# Patient Record
Sex: MALE | Race: WHITE | Employment: FULL TIME | URBAN - METROPOLITAN AREA
[De-identification: names, ages, dates, MRNs, and addresses within clinical notes are randomized per-mention and may not be internally consistent; named-entity substitution may affect disease eponyms.]

---

## 2018-10-12 ENCOUNTER — HOSPITAL ENCOUNTER (INPATIENT)
Facility: CLINIC | Age: 38
LOS: 2 days | Discharge: HOME | DRG: 603 | End: 2018-10-14
Attending: EMERGENCY MEDICINE | Admitting: INTERNAL MEDICINE
Payer: COMMERCIAL

## 2018-10-12 ENCOUNTER — APPOINTMENT (OUTPATIENT)
Dept: GENERAL RADIOLOGY | Facility: CLINIC | Age: 38
DRG: 603 | End: 2018-10-12
Attending: EMERGENCY MEDICINE
Payer: COMMERCIAL

## 2018-10-12 DIAGNOSIS — L03.114 CELLULITIS OF LEFT UPPER EXTREMITY: ICD-10-CM

## 2018-10-12 DIAGNOSIS — I89.1 LYMPHANGITIS: ICD-10-CM

## 2018-10-12 DIAGNOSIS — L03.115 CELLULITIS OF RIGHT LOWER EXTREMITY: ICD-10-CM
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
## 2018-10-12 PROBLEM — L03.90 CELLULITIS: Status: ACTIVE | Noted: 2018-10-12

## 2018-10-12 LAB
ANION GAP SERPL CALCULATED.3IONS-SCNC: 7 MMOL/L (ref 3–14)
BASOPHILS # BLD AUTO: 0 10E9/L (ref 0–0.2)
BASOPHILS NFR BLD AUTO: 0.2 %
BUN SERPL-MCNC: 11 MG/DL (ref 7–30)
CALCIUM SERPL-MCNC: 9.6 MG/DL (ref 8.5–10.1)
CHLORIDE SERPL-SCNC: 101 MMOL/L (ref 94–109)
CO2 SERPL-SCNC: 29 MMOL/L (ref 20–32)
CREAT SERPL-MCNC: 0.82 MG/DL (ref 0.66–1.25)
CRP SERPL-MCNC: 76.8 MG/L (ref 0–8)
DIFFERENTIAL METHOD BLD: ABNORMAL
EOSINOPHIL # BLD AUTO: 0.1 10E9/L (ref 0–0.7)
EOSINOPHIL NFR BLD AUTO: 0.7 %
ERYTHROCYTE [DISTWIDTH] IN BLOOD BY AUTOMATED COUNT: 12.8 % (ref 10–15)
GFR SERPL CREATININE-BSD FRML MDRD: >90 ML/MIN/1.7M2
GLUCOSE SERPL-MCNC: 100 MG/DL (ref 70–99)
HCT VFR BLD AUTO: 47.1 % (ref 40–53)
HGB BLD-MCNC: 15.8 G/DL (ref 13.3–17.7)
IMM GRANULOCYTES # BLD: 0 10E9/L (ref 0–0.4)
IMM GRANULOCYTES NFR BLD: 0.3 %
LYMPHOCYTES # BLD AUTO: 1.3 10E9/L (ref 0.8–5.3)
LYMPHOCYTES NFR BLD AUTO: 12.1 %
MCH RBC QN AUTO: 32.4 PG (ref 26.5–33)
MCHC RBC AUTO-ENTMCNC: 33.5 G/DL (ref 31.5–36.5)
MCV RBC AUTO: 97 FL (ref 78–100)
MONOCYTES # BLD AUTO: 0.9 10E9/L (ref 0–1.3)
MONOCYTES NFR BLD AUTO: 8.2 %
NEUTROPHILS # BLD AUTO: 8.5 10E9/L (ref 1.6–8.3)
NEUTROPHILS NFR BLD AUTO: 78.5 %
NRBC # BLD AUTO: 0 10*3/UL
NRBC BLD AUTO-RTO: 0 /100
PLATELET # BLD AUTO: 226 10E9/L (ref 150–450)
POTASSIUM SERPL-SCNC: 4.3 MMOL/L (ref 3.4–5.3)
RBC # BLD AUTO: 4.88 10E12/L (ref 4.4–5.9)
SODIUM SERPL-SCNC: 137 MMOL/L (ref 133–144)
WBC # BLD AUTO: 10.8 10E9/L (ref 4–11)

## 2018-10-12 PROCEDURE — 90715 TDAP VACCINE 7 YRS/> IM: CPT | Performed by: EMERGENCY MEDICINE

## 2018-10-12 PROCEDURE — 90471 IMMUNIZATION ADMIN: CPT | Performed by: EMERGENCY MEDICINE

## 2018-10-12 PROCEDURE — 73630 X-RAY EXAM OF FOOT: CPT | Mod: RT

## 2018-10-12 PROCEDURE — 25000128 H RX IP 250 OP 636

## 2018-10-12 PROCEDURE — 86140 C-REACTIVE PROTEIN: CPT | Performed by: EMERGENCY MEDICINE

## 2018-10-12 PROCEDURE — 25000132 ZZH RX MED GY IP 250 OP 250 PS 637: Performed by: PHYSICIAN ASSISTANT

## 2018-10-12 PROCEDURE — 80048 BASIC METABOLIC PNL TOTAL CA: CPT | Performed by: EMERGENCY MEDICINE

## 2018-10-12 PROCEDURE — 99207 ZZC APP CREDIT; MD BILLING SHARED VISIT: CPT | Performed by: PHYSICIAN ASSISTANT

## 2018-10-12 PROCEDURE — 85025 COMPLETE CBC W/AUTO DIFF WBC: CPT | Performed by: EMERGENCY MEDICINE

## 2018-10-12 PROCEDURE — 99285 EMERGENCY DEPT VISIT HI MDM: CPT | Mod: 25 | Performed by: EMERGENCY MEDICINE

## 2018-10-12 PROCEDURE — 12000001 ZZH R&B MED SURG/OB UMMC

## 2018-10-12 PROCEDURE — 96365 THER/PROPH/DIAG IV INF INIT: CPT | Performed by: EMERGENCY MEDICINE

## 2018-10-12 PROCEDURE — 25000128 H RX IP 250 OP 636: Performed by: PHYSICIAN ASSISTANT

## 2018-10-12 PROCEDURE — 87040 BLOOD CULTURE FOR BACTERIA: CPT | Performed by: EMERGENCY MEDICINE

## 2018-10-12 PROCEDURE — 99284 EMERGENCY DEPT VISIT MOD MDM: CPT | Mod: Z6 | Performed by: EMERGENCY MEDICINE

## 2018-10-12 PROCEDURE — 73130 X-RAY EXAM OF HAND: CPT | Mod: LT

## 2018-10-12 PROCEDURE — 25000128 H RX IP 250 OP 636: Performed by: EMERGENCY MEDICINE

## 2018-10-12 PROCEDURE — 99222 1ST HOSP IP/OBS MODERATE 55: CPT | Mod: AI | Performed by: INTERNAL MEDICINE

## 2018-10-12 RX ORDER — SODIUM CHLORIDE 9 MG/ML
INJECTION, SOLUTION INTRAVENOUS
Status: COMPLETED
Start: 2018-10-12 | End: 2018-10-12

## 2018-10-12 RX ORDER — ONDANSETRON 4 MG/1
4 TABLET, ORALLY DISINTEGRATING ORAL EVERY 6 HOURS PRN
Status: DISCONTINUED | OUTPATIENT
Start: 2018-10-12 | End: 2018-10-14 | Stop reason: HOSPADM

## 2018-10-12 RX ORDER — NALOXONE HYDROCHLORIDE 0.4 MG/ML
.1-.4 INJECTION, SOLUTION INTRAMUSCULAR; INTRAVENOUS; SUBCUTANEOUS
Status: DISCONTINUED | OUTPATIENT
Start: 2018-10-12 | End: 2018-10-14 | Stop reason: HOSPADM

## 2018-10-12 RX ORDER — PIPERACILLIN SODIUM, TAZOBACTAM SODIUM 3; .375 G/15ML; G/15ML
3.38 INJECTION, POWDER, LYOPHILIZED, FOR SOLUTION INTRAVENOUS EVERY 6 HOURS
Status: DISCONTINUED | OUTPATIENT
Start: 2018-10-12 | End: 2018-10-14 | Stop reason: HOSPADM

## 2018-10-12 RX ORDER — PIPERACILLIN SODIUM, TAZOBACTAM SODIUM 3; .375 G/15ML; G/15ML
3.38 INJECTION, POWDER, LYOPHILIZED, FOR SOLUTION INTRAVENOUS ONCE
Status: COMPLETED | OUTPATIENT
Start: 2018-10-12 | End: 2018-10-12

## 2018-10-12 RX ORDER — ACETAMINOPHEN 325 MG/1
650 TABLET ORAL EVERY 4 HOURS PRN
Status: DISCONTINUED | OUTPATIENT
Start: 2018-10-12 | End: 2018-10-14 | Stop reason: HOSPADM

## 2018-10-12 RX ORDER — ONDANSETRON 2 MG/ML
4 INJECTION INTRAMUSCULAR; INTRAVENOUS EVERY 6 HOURS PRN
Status: DISCONTINUED | OUTPATIENT
Start: 2018-10-12 | End: 2018-10-14 | Stop reason: HOSPADM

## 2018-10-12 RX ADMIN — ACETAMINOPHEN 650 MG: 325 TABLET, FILM COATED ORAL at 22:12

## 2018-10-12 RX ADMIN — PIPERACILLIN SODIUM AND TAZOBACTAM SODIUM 3.38 G: 3; .375 INJECTION, POWDER, LYOPHILIZED, FOR SOLUTION INTRAVENOUS at 09:51

## 2018-10-12 RX ADMIN — SODIUM CHLORIDE, PRESERVATIVE FREE: 5 INJECTION INTRAVENOUS at 17:06

## 2018-10-12 RX ADMIN — CLOSTRIDIUM TETANI TOXOID ANTIGEN (FORMALDEHYDE INACTIVATED), CORYNEBACTERIUM DIPHTHERIAE TOXOID ANTIGEN (FORMALDEHYDE INACTIVATED), BORDETELLA PERTUSSIS TOXOID ANTIGEN (GLUTARALDEHYDE INACTIVATED), BORDETELLA PERTUSSIS FILAMENTOUS HEMAGGLUTININ ANTIGEN (FORMALDEHYDE INACTIVATED), BORDETELLA PERTUSSIS PERTACTIN ANTIGEN, AND BORDETELLA PERTUSSIS FIMBRIAE 2/3 ANTIGEN 0.5 ML: 5; 2; 2.5; 5; 3; 5 INJECTION, SUSPENSION INTRAMUSCULAR at 10:27

## 2018-10-12 RX ADMIN — PIPERACILLIN SODIUM,TAZOBACTAM SODIUM 3.38 G: 3; .375 INJECTION, POWDER, FOR SOLUTION INTRAVENOUS at 17:03

## 2018-10-12 RX ADMIN — PIPERACILLIN SODIUM,TAZOBACTAM SODIUM 3.38 G: 3; .375 INJECTION, POWDER, FOR SOLUTION INTRAVENOUS at 22:07

## 2018-10-12 ASSESSMENT — ACTIVITIES OF DAILY LIVING (ADL)
ADLS_ACUITY_SCORE: 11
ADLS_ACUITY_SCORE: 13

## 2018-10-12 ASSESSMENT — ENCOUNTER SYMPTOMS
ARTHRALGIAS: 0
JOINT SWELLING: 0
SHORTNESS OF BREATH: 0
FEVER: 0
CHILLS: 0
WOUND: 1
ABDOMINAL PAIN: 0
NAUSEA: 0
NUMBNESS: 0
VOMITING: 0
COUGH: 0
WEAKNESS: 0

## 2018-10-12 NOTE — PLAN OF CARE
Problem: Patient Care Overview  Goal: Individualization & Mutuality  Outcome: Improving  Patient arrived on floor with dog bite, alert and oriented times four, left hand and right foot dressing clean,dry and intact, redness remains on hand, able to slightly wiggle fingers, drinking and voiding well. Will continue to monitor patient.

## 2018-10-12 NOTE — H&P
[facility]    Internal Medicine History and Physical - [place]       Date of Admission:  10/12/2018    Assessment & Plan   James Orr is a 38 year old male with no significant past medical history who was presented to [facility] s/p dog bit with erythema, edema and lymphangitis of R foot and L hand.     # Cellulitis of L hand and R foot 2/2 dog bite  # Lymphangitis  Multiple puncture wounds on L palm and R foot after breaking up a dog fight 10/11. On 10/12 noted extension or erythema from L hand up left forearm to L antecubital. Denies fevers or chills. No leukocytosis on admission. XR R foot and L hand without bony abnormality or evidence of FB. BC x 2 obtained and started on IV Zosyn in ED.   - Continue Zosyn  - Follow BCx  - Add on CRP  - Trend CBC  - Ortho consult      Diet:  Regular diet  Fluids: PO intake  Freeman Catheter: not present    DVT Prophylaxis: Mechanical, ambulation  Code Status: No Order    Expected discharge: 2 - 3 days, recommended to prior living arrangement once antibiotic plan established.    The patient's care was discussed with the Attending Physician, Dr. Pryor.    Eda Sanchez PA-C  Internal Medicine Staff Hospitalist Service  [facility]  Pager: [phone]  Please see sticky note for cross cover information  ______________________________________________________________________    Chief Complaint   Hand/foot swelling     History is obtained from the patient    History of Present Illness   James Orr is a 38 year old male with no significant past medical history who was presented to [facility] s/p dog bit with erythema, edema and lymphangitis of R foot and L hand.   Pt sustained multiple dog bites to L hand and R foot yesterday afternoon. He reports that he attempted to break up a fight between his dog and another dog that was staying with him. Following the attack he noted that he was bleeding a small amount from his L  hand. 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 When he returned home he noted that his hand was red and swollen, but not significantly increased from the morning. When he awoke this morning (10/12) he found that his hand was significantly more swollen, and the redness that had previously been surrounding just the puncture sites had extended up his arm. He presented to urgent care where he was instructed to go to the emergency department.   In the ED he denies any fevers or chills. He notes that both dogs are up to date on their vaccines including rabies. He is quite certain that he is up to date on his vaccinations, including tetanus as he was seen in primary care clinic within the last 3 years.  In the ED he had a XR of L hand and R foot which were negative for any bony abnormalities or FB. He also received tetanus booster and given a dose of Zosyn.    Review of Systems   The 10 point Review of Systems is negative other than noted in the HPI or here.     Past Medical History    Past medical history reviewed with no previously diagnosed medical problems.    Past Surgical History   Past surgical history review with no previous surgeries identified.    Social History   Social History   Substance Use Topics     Smoking status: Never Smoker     Smokeless tobacco: Current User      Comment: Vaps     Alcohol use Yes      Comment: 1-2/night       Family History   I have reviewed this patient's family history and updated it with pertinent information if needed.   No family history on file.    Prior to Admission Medications   None     Allergies   Allergies   Allergen Reactions     Metal [Staples]      Sensitive to metal on clothing. Like belts, etc.       Physical Exam   Vital Signs: Temp: 98.8  F (37.1  C) Temp src: Oral BP: 148/89 Pulse: 86   Resp: 16 SpO2: 100 % O2 Device: None (Room air)    Weight: 190 lbs 6 oz    General Appearance: Alert and  oriented x 3, NAD  Eyes: PERRL  HEENT: Head normocephalic, atraumatic  Respiratory: Lungs CTAB, no wheezing or crackles  Cardiovascular: RRR, no murmurs or gallops  GI: Abdomen soft, non distended, non tender to palpation  Lymph/Hematologic: Lymphangitis extending from L wrist up L anterior forearm to antecubital  Skin: warm and dry to touch, erythema, edema of dorsal and vargas aspect of L hand. 1inch puncture wound along dorsal aspect of palp just proximal to MCP joint of 5th metatarsal with some drainage. R foot with multiple puncture wounds on plantar and dorsal aspect, erythema and edema present not involving the mortise joint. No fluctuance present  Musculoskeletal: Tenderness to palpation along skin of L palm, able to extend all PIP, MCP and DIP joints w/ some stiffness 2/2 swelling  Neurologic: no focal deficits  Psychiatric: mood stable    Data   Data reviewed today: I reviewed all medications, new labs and imaging results over the last 24 hours.      Recent Labs  Lab 10/12/18  0918   WBC 10.8   HGB 15.8   MCV 97         POTASSIUM 4.3   CHLORIDE 101   CO2 29   BUN 11   CR 0.82   ANIONGAP 7   AB 9.6   *

## 2018-10-12 NOTE — PROVIDER NOTIFICATION
Eda called back and was updated about patient elevated BP and she said it was ok , no new order received.

## 2018-10-12 NOTE — ED NOTES
[facility]   ED Nurse to Floor Handoff     James Orr is a 38 year old male who speaks English and lives alone,  in a home  They arrived in the ED by car from home    ED Chief Complaint: Dog Bite (Bite by 2 dogs yesterday at about 0500 in his house.  Pt states both dogs shot are UTD and he was dog sitting them and they got in a fight and he broke it up.  Bit in left hand and right foot.)    ED Dx;   Final diagnoses:   Dog bite, initial encounter   Cellulitis of left upper extremity   Cellulitis of right lower extremity   Lymphangitis         Needed?: No    Allergies:   Allergies   Allergen Reactions     Metal [Staples]      Sensitive to metal on clothing. Like belts, etc.   .  Past Medical Hx: History reviewed. No pertinent past medical history.   Baseline Mental status: WDL  Current Mental Status changes: at basesline    Infection present or suspected this encounter: yes skin/wound/contact  Sepsis suspected: No  Isolation type: No active isolations     Activity level - Baseline/Home:  Independent  Activity Level - Current:   Independent    Bariatric equipment needed?: No    In the ED these meds were given:   Medications   piperacillin-tazobactam (ZOSYN) 3.375 g vial to attach to  mL bag (0 g Intravenous Stopped 10/12/18 1026)   Tdap (tetanus-diphtheria-acell pertussis) (ADACEL) injection 0.5 mL (0.5 mLs Intramuscular Given 10/12/18 1027)       Drips running?  No    Home pump  No    Current LDAs  Peripheral IV 10/12/18 Right (Active)   Site Assessment WDL 10/12/2018  9:17 AM   Line Status Saline locked 10/12/2018  9:17 AM   Phlebitis Scale 0-->no symptoms 10/12/2018  9:17 AM   Infiltration Scale 0 10/12/2018  9:17 AM   Extravasation? No 10/12/2018  9:17 AM   Number of days:0       Labs results:   Labs Ordered and Resulted from Time of ED Arrival Up to the Time of Departure from the ED   CBC WITH PLATELETS DIFFERENTIAL - Abnormal; Notable for the  following:        Result Value    Absolute Neutrophil 8.5 (*)     All other components within normal limits   BASIC METABOLIC PANEL - Abnormal; Notable for the following:     Glucose 100 (*)     All other components within normal limits   BLOOD CULTURE   BLOOD CULTURE       Imaging Studies:   Recent Results (from the past 24 hour(s))   XR Hand Left G/E 3 Views    Narrative    XR HAND LT G/E 3 VW   10/12/2018 9:40 AM     HISTORY:  left 5th MCP puncture wound, rule out FB or fracture;       Impression    IMPRESSION: Unremarkable exam.    DAYLIN QUIROZ MD   Foot  XR, G/E 3 views, right    Narrative    XR FOOT RT G/E 3 VW   10/12/2018 9:41 AM     HISTORY:  right great toe injury from dog bites, rule out FB/fracture;        Impression    IMPRESSION: Unremarkable exam.    DAYLIN QUIROZ MD       Recent vital signs:   BP (!) 146/101  Pulse 95  Temp 98.9  F (37.2  C) (Oral)  Resp 16  Wt 86.4 kg (190 lb 6 oz)  SpO2 99%    Cardiac Rhythm: Other, not assessed  Pt needs tele? No  Skin/wound Issues: Infection from dog bite    Code Status: Full Code    Pain control: good    Nausea control: pt had none    Abnormal labs/tests/findings requiring intervention: normal    Family present during ED course? No   Family Comments/Social Situation comments:  without children.  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

## 2018-10-12 NOTE — PROVIDER NOTIFICATION
Patient Blood pressure elevated. Paged internal medicine PA ( Eda Sanchez) and awaiting call back. Will continue to monitor patient.

## 2018-10-12 NOTE — ED PROVIDER NOTES
History     Chief Complaint   Patient presents with     Dog Bite     Bite by 2 dogs yesterday at about 0500 in his house.  Pt states both dogs shot are UTD and he was dog sitting them and they got in a fight and he broke it up.  Bit in left hand and right foot.     HPI  James Orr is a 38 year old otherwise healthy right-hand-dominant male who sustained dog bites to the left hand and right foot at 5 am yesterday morning.  He reports he broke up a fight between his dog and a friend's dog who is staying with him.  Both dogs are UTD on immunizations including rabies.  Since then he has had increased redness and swelling at both sites.  He has noted redness extending up his left arm.  He is able to flex and extend at the joints near the puncture wounds though has had increasing pain and swelling.  He reports he did receive immunization updates approximately 3 years ago though is unsure which immunizations.  We do not have any record of his most recent updates.  He denies any recent fevers, chills or other acute concerns.    I have reviewed the Medications, Allergies, Past Medical and Surgical History, and Social History in the Epic system.    Review of Systems   Constitutional: Negative for chills and fever.   Respiratory: Negative for cough and shortness of breath.    Cardiovascular: Negative for chest pain.   Gastrointestinal: Negative for abdominal pain, nausea and vomiting.   Musculoskeletal: Negative for arthralgias and joint swelling.   Skin: Positive for wound.   Neurological: Negative for weakness and numbness.   All other systems reviewed and are negative.      Physical Exam   BP: (!) 146/101  Pulse: 95  Temp: 98.9  F (37.2  C)  Resp: 16  Weight: 86.4 kg (190 lb 6 oz)  SpO2: 99 %      Physical Exam   General: patient is alert and oriented and in no acute distress   Head: atraumatic and normocephalic   EENT: moist mucus membranes, sclera anicteric  Neck: supple   Cardiovascular: regular rate and  rhythm, extremities warm and well perfused, no lower extremity edema, 2+ radial and DP pulses, brisk capillary refill in digits of left hand and right foot  Pulmonary: No respiratory distress  Musculoskeletal: TTP along the palm of the left hand, able to fully extend the digits of the left hand including of the left fifth MCP, PIP and DIP joints, no tenderness to palpation along the flexor tendon sheath of the left fifth digit, able to flex to approximately 45 degrees of the left fifth MCP and PIP joint, tenderness to palpation of the right great toe with full extension but limited flexion secondary to pain and swelling  Neurological: alert and oriented, moving all extremities symmetrically, sensation to light touch along the digits of the left hand and right foot is intact   skin: warm, dry, skin wounds as in imaging below                            ED Course     ED Course     Procedures             Critical Care time:  none             Labs Ordered and Resulted from Time of ED Arrival Up to the Time of Departure from the ED   CBC WITH PLATELETS DIFFERENTIAL - Abnormal; Notable for the following:        Result Value    Absolute Neutrophil 8.5 (*)     All other components within normal limits   BASIC METABOLIC PANEL   BLOOD CULTURE   BLOOD CULTURE            Assessments & Plan (with Medical Decision Making)   38-year-old otherwise healthy right-hand-dominant male who presents with dog bites to the left hand and right foot that he sustained yesterday morning.  Patient was attempting to break up a fight between his dog and a friend's dog who he was watching when he was inadvertently bitten.  Both dogs are up-to-date on their immunizations.  The patient reports that he believes he is up-to-date as well however we do not have record of his last tetanus immunization.  Tetanus updated in the ED.  He is hemodynamically stable and afebrile.  The patient does have significant erythema and swelling along the left fifth MCP  joint as well as the right great toe.  He has lymphangitis extending up to the axilla on the left arm.  Given the rapidity of the progression with associated lymphangitis we will plan to treat with IV antibiotics.  At this time he does not appear to have involvement of the joint though will require close observation.  Patient did have x-rays obtained of the left hand and right foot to rule out foreign body or fracture.  X-rays are negative.  He was treated with Zosyn.  Patient will be admitted to Medicine for continued antibiotics with transition to oral.    I have reviewed the nursing notes.    I have reviewed the findings, diagnosis, plan and need for follow up with the patient.  This part of the document was transcribed by Laurie Beard Scribe.   New Prescriptions    No medications on file       Final diagnoses:   Dog bite, initial encounter   Cellulitis of left upper extremity   Cellulitis of right lower extremity   Lymphangitis       10/12/2018   [facility], [place], EMERGENCY DEPARTMENT     Genet Knight MD  10/12/18 2644

## 2018-10-12 NOTE — PROGRESS NOTES
Patient is now on regular diet and should NPO after Midnight. Antibiotic given. Denied pain at this time. Will continue to monitor patient.

## 2018-10-12 NOTE — IP AVS SNAPSHOT
[facility]    [address] [place] [address]    Phone:  [phone]                                       After Visit Summary   10/12/2018    James Orr    MRN: [id number]           After Visit Summary Signature Page     I have received my discharge instructions, and my questions have been answered. I have discussed any challenges I see with this plan with the nurse or doctor.    ..........................................................................................................................................  Patient/Patient Representative Signature      ..........................................................................................................................................  Patient Representative Print Name and Relationship to Patient    ..................................................               ................................................  Date                                   Time    ..........................................................................................................................................  Reviewed by Signature/Title    ...................................................              ..............................................  Date                                               Time          22EPIC Rev 08/18

## 2018-10-12 NOTE — ED NOTES
Introduced self to pt. Pt explained his current situation. This writer gave pt the update to moving him to upstairs.

## 2018-10-12 NOTE — IP AVS SNAPSHOT
MRN:[id number]                      After Visit Summary   10/12/2018    James Orr    MRN: [id number]           Thank you!     Thank you for choosing [facility] for your care. Our goal is always to provide you with excellent care. Hearing back from our patients is one way we can continue to improve our services. Please take a few minutes to complete the written survey that you may receive in the mail after you visit with us. Thank you!        Patient Information     Date Of Birth          1980        Designated Caregiver       Most Recent Value    Caregiver    Will someone help with your care after discharge? no      About your hospital stay     You were admitted on:  October 12, 2018 You last received care in the:  [facility]    You were discharged on:  October 14, 2018        Reason for your hospital stay       L hand infection secondary to a dog bite.  R foot dog bite                  Who to Call     For medical emergencies, please call 911.  For non-urgent questions about your medical care, please call your primary care provider or clinic, None          Attending Provider     Provider Specialty    Genet Knight MD Emergency Medicine Emergency Medical Services    Dinesh Pryor MD Internal Medicine       Primary Care Provider Fax #    Physician No Ref-Primary [phone]      Further instructions from your care team       Return to the Emergency Room if you experience increased pain or swelling left hand or right foot, or if you experience severe diarrhea    Pending Results     Date and Time Order Name Status Description    10/12/2018 0909 Blood culture Preliminary     10/12/2018 0909 Blood culture Preliminary             Statement of Approval     Ordered          10/14/18 0811  I have reviewed and agree with all the recommendations and orders detailed in this document.  EFFECTIVE NOW     Approved and electronically signed by:  Dinesh Pryor MD            "  Admission Information     Date & Time Provider Department Dept. Phone    10/12/2018 Dinesh Pryor MD  8A [phone]      Your Vitals Were     Blood Pressure Pulse Temperature Respirations Height Weight    119/85 (BP Location: Left arm) 66 97.4  F (36.3  C) (Oral) 16 1.702 m (5' 7\") 86.4 kg (190 lb 6 oz)    Pulse Oximetry BMI (Body Mass Index)                100% 29.82 kg/m2          [email or web address] Information     [employer] lets you send messages to your doctor, view your test results, renew your prescriptions, schedule appointments and more. To sign up, go to [email or web address] . Click on \"Log in\" on the left side of the screen, which will take you to the Welcome page. Then click on \"Sign up Now\" on the right side of the page.     You will be asked to enter the access code listed below, as well as some personal information. Please follow the directions to create your username and password.     Your access code is: [email or web address]  Expires: 2019  4:48 AM     Your access code will  in 90 days. If you need help or a new code, please call your [place] clinic or [phone].        Care EveryWhere ID     This is your Care EveryWhere ID. This could be used by other organizations to access your [place] medical records  [id number]        Equal Access to Services     [facility] : Hadii duarte de la cruzo Somegan, waaxda luqadaha, qaybta kaalmada mary bethyada, barry coleman . So [facility] [phone].    ATENCIÓN: Si habla español, tiene a dill disposición servicios gratuitos de asistencia lingüística. Llame al [phone].    We comply with applicable federal civil rights laws and [place] laws. We do not discriminate on the basis of race, color, national origin, age, disability, sex, sexual orientation, or gender identity.               Review of your medicines      START taking        Dose / Directions    amoxicillin-clavulanate 875-125 MG per tablet   Commonly known as:  AUGMENTIN "        Dose:  1 tablet   Take 1 tablet by mouth 2 times daily for 11 days   Quantity:  22 tablet   Refills:  0            Where to get your medicines      These medications were sent to [facility] [place], [place] - [address]  [address], [facility] [address]     Phone:  [phone]     amoxicillin-clavulanate 875-125 MG per tablet                Protect others around you: Learn how to safely use, store and throw away your medicines at www.disposemymeds.org.        ANTIBIOTIC INSTRUCTION     You've Been Prescribed an Antibiotic - Now What?  Your healthcare team thinks that you or your loved one might have an infection. Some infections can be treated with antibiotics, which are powerful, life-saving drugs. Like all medications, antibiotics have side effects and should only be used when necessary. There are some important things you should know about your antibiotic treatment.      Your healthcare team may run tests before you start taking an antibiotic.    Your team may take samples (e.g., from your blood, urine or other areas) to run tests to look for bacteria. These test can be important to determine if you need an antibiotic at all and, if you do, which antibiotic will work best.      Within a few days, your healthcare team might change or even stop your antibiotic.    Your team may start you on an antibiotic while they are working to find out what is making you sick.    Your team might change your antibiotic because test results show that a different antibiotic would be better to treat your infection.    In some cases, once your team has more information, they learn that you do not need an antibiotic at all. They may find out that you don't have an infection, or that the antibiotic you're taking won't work against your infection. For example, an infection caused by a virus can't be treated with antibiotics. Staying on an antibiotic when you don't need it is more likely to be  harmful than helpful.      You may experience side effects from your antibiotic.    Like all medications, antibiotics have side effects. Some of these can be serious.    Let you healthcare team know if you have any known allergies when you are admitted to the hospital.    One significant side effect of nearly all antibiotics is the risk of severe and sometimes deadly diarrhea caused by Clostridium difficile (C. Difficile). This occurs when a person takes antibiotics because some good germs are destroyed. Antibiotic use allows C. diificile to take over, putting patients at high risk for this serious infection.    As a patient or caregiver, it is important to understand your or your loved one's antibiotic treatment. It is especially important for caregivers to speak up when patients can't speak for themselves. Here are some important questions to ask your healthcare team.    What infection is this antibiotic treating and how do you know I have that infection?    What side effects might occur from this antibiotic?    How long will I need to take this antibiotic?    Is it safe to take this antibiotic with other medications or supplements (e.g., vitamins) that I am taking?     Are there any special directions I need to know about taking this antibiotic? For example, should I take it with food?    How will I be monitored to know whether my infection is responding to the antibiotic?    What tests may help to make sure the right antibiotic is prescribed for me?      Information provided by:  www.cdc.gov/getsmart  U.S. Department of Health and Human Services  Centers for disease Control and Prevention  National Center for Emerging and Zoonotic Infectious Diseases  Division of Healthcare Quality Promotion             Medication List: This is a list of all your medications and when to take them. Check marks below indicate your daily home schedule. Keep this list as a reference.      Medications           Morning Afternoon  Evening Bedtime As Needed    amoxicillin-clavulanate 875-125 MG per tablet   Commonly known as:  AUGMENTIN   Take 1 tablet by mouth 2 times daily for 11 days

## 2018-10-13 LAB
BASOPHILS # BLD AUTO: 0 10E9/L (ref 0–0.2)
BASOPHILS NFR BLD AUTO: 0.3 %
CRP SERPL-MCNC: 71.1 MG/L (ref 0–8)
DIFFERENTIAL METHOD BLD: NORMAL
EOSINOPHIL # BLD AUTO: 0.3 10E9/L (ref 0–0.7)
EOSINOPHIL NFR BLD AUTO: 4.2 %
ERYTHROCYTE [DISTWIDTH] IN BLOOD BY AUTOMATED COUNT: 12.7 % (ref 10–15)
HCT VFR BLD AUTO: 42.7 % (ref 40–53)
HGB BLD-MCNC: 14.1 G/DL (ref 13.3–17.7)
IMM GRANULOCYTES # BLD: 0 10E9/L (ref 0–0.4)
IMM GRANULOCYTES NFR BLD: 0.3 %
LYMPHOCYTES # BLD AUTO: 1.1 10E9/L (ref 0.8–5.3)
LYMPHOCYTES NFR BLD AUTO: 15.9 %
MCH RBC QN AUTO: 31.9 PG (ref 26.5–33)
MCHC RBC AUTO-ENTMCNC: 33 G/DL (ref 31.5–36.5)
MCV RBC AUTO: 97 FL (ref 78–100)
MONOCYTES # BLD AUTO: 0.6 10E9/L (ref 0–1.3)
MONOCYTES NFR BLD AUTO: 9.1 %
NEUTROPHILS # BLD AUTO: 4.7 10E9/L (ref 1.6–8.3)
NEUTROPHILS NFR BLD AUTO: 70.2 %
NRBC # BLD AUTO: 0 10*3/UL
NRBC BLD AUTO-RTO: 0 /100
PLATELET # BLD AUTO: 195 10E9/L (ref 150–450)
RBC # BLD AUTO: 4.42 10E12/L (ref 4.4–5.9)
WBC # BLD AUTO: 6.7 10E9/L (ref 4–11)

## 2018-10-13 PROCEDURE — 86140 C-REACTIVE PROTEIN: CPT | Performed by: PHYSICIAN ASSISTANT

## 2018-10-13 PROCEDURE — 12000001 ZZH R&B MED SURG/OB UMMC

## 2018-10-13 PROCEDURE — 85025 COMPLETE CBC W/AUTO DIFF WBC: CPT | Performed by: PHYSICIAN ASSISTANT

## 2018-10-13 PROCEDURE — 99232 SBSQ HOSP IP/OBS MODERATE 35: CPT | Performed by: INTERNAL MEDICINE

## 2018-10-13 PROCEDURE — 36415 COLL VENOUS BLD VENIPUNCTURE: CPT | Performed by: PHYSICIAN ASSISTANT

## 2018-10-13 PROCEDURE — 25000128 H RX IP 250 OP 636: Performed by: PHYSICIAN ASSISTANT

## 2018-10-13 RX ADMIN — PIPERACILLIN SODIUM,TAZOBACTAM SODIUM 3.38 G: 3; .375 INJECTION, POWDER, FOR SOLUTION INTRAVENOUS at 16:31

## 2018-10-13 RX ADMIN — PIPERACILLIN SODIUM,TAZOBACTAM SODIUM 3.38 G: 3; .375 INJECTION, POWDER, FOR SOLUTION INTRAVENOUS at 22:04

## 2018-10-13 RX ADMIN — PIPERACILLIN SODIUM,TAZOBACTAM SODIUM 3.38 G: 3; .375 INJECTION, POWDER, FOR SOLUTION INTRAVENOUS at 04:18

## 2018-10-13 RX ADMIN — PIPERACILLIN SODIUM,TAZOBACTAM SODIUM 3.38 G: 3; .375 INJECTION, POWDER, FOR SOLUTION INTRAVENOUS at 10:13

## 2018-10-13 ASSESSMENT — ACTIVITIES OF DAILY LIVING (ADL)
ADLS_ACUITY_SCORE: 8.5
ADLS_ACUITY_SCORE: 12
ADLS_ACUITY_SCORE: 8.5

## 2018-10-13 NOTE — CONSULTS
[facility]  ORTHOPAEDIC SURGERY CONSULT - HISTORY AND PHYSICAL    DATE OF CONSULT: 10/12/2018 10:05 PM    REQUESTING PROVIDER: Dinesh Pryor MD, MD - [facility] Staff.    CC: Left hand and right foot dog bites with associated cellulitis    DATE OF INJURY: 10/11/18    HISTORY OF PRESENT ILLNESS:   James Orr is a 38 year old R-hand dominant otherwise healthy male who was watching his friend's dog yesterday morning when he sustained bites to his left hand and right foot while trying to prevent a dog fight. Patient reports this is a well-known dog to him with no prior history of similar behavior and up to date vaccinations. He states that ~24 hours after the bite he noticed increased redness, swelling and mild discomfort and so he presented to the [facility] ED. Patient denies fevers, chills or purulent drainage from the wounds. He was started on antibiotics upon presentation to the ED and his tetanus was updated. Notes he is able to move his fingers and toes without significant discomfort at this time. He is able to ambulate without significant pain, as well.    Denies numbness, tingling, or weakness to the affected extremities.  Denies fevers, chills, nausea, vomiting, diarrhea, constipation, chest pain, shortness of breath.    PAST MEDICAL HISTORY:   History reviewed. No pertinent past medical history.  [Patient denies any personal history of bleeding disorders, clotting disorders, or adverse reactions to anesthesia].    PAST SURGICAL HISTORY:    History reviewed. No pertinent surgical history.    MEDICATIONS:   Prior to Admission medications    Not on File       ALLERGIES:   Metal [staples]    SOCIAL HISTORY:   Social History     Social History     Marital status:      Spouse name: N/A     Number of children: N/A     Years of education: N/A     Occupational History     Not on file.     Social History Main Topics     Smoking status: Never Smoker     Smokeless tobacco: Current User       Comment: Vaps     Alcohol use Yes      Comment: 1-2/night     Drug use: No     Sexual activity: Yes      Comment: Spouse (wife)     Other Topics Concern     Not on file     Social History Narrative    Date of Service:2013     Name: James OJHNS (Month, Day, Year of birth): 1980     MRN: [id number]     New Patient: Yes    Preferred Language: English     Needed: No    County of Residence: [facility]    Marital Status:     Household size: 2    Number of Dependents: 0    Pregnant: No    Average Monthly Income: $ 0     Citizenship Status: [place]        13    Patient was informed about AA care and [place] care. Patient will fill out an AA application and return the application once completed.                 Living situation: Patient lives alone in [place]. Works as manager at [employer]. Enjoys playing the [employer] in the [employer] as well as doing crossfit.    FAMILY HISTORY:  No family history on file.    Patient denies known family history of bleeding, clotting, or anesthesia related complications.     REVIEW OF SYSTEMS:   Positive per HPI. Otherwise a 10-point reviews of systems was negative except as noted above in the HPI.   Patient denies any new or recent: fevers, chills, rashes, headache, vision changes, hearing changes, sinus pain, sore throat, neck pain, swollen glands, cough, sob, chest pain/pressure, abdominal pain, nausea, vomiting, diarrhea, constipation, dysuria, hematuria, leg swelling, myalgias, numbness or tingling.    PHYSICAL EXAM:   Vitals:    10/12/18 0802 10/12/18 1244 10/12/18 1530   BP: (!) 146/101 148/89 (!) 152/91   BP Location:  Right arm    Pulse: 95 86 77   Resp: 16 16 16   Temp: 98.9  F (37.2  C) 98.8  F (37.1  C) 98.1  F (36.7  C)   TempSrc: Oral Oral Oral   SpO2: 99% 100% 99%   Weight: 86.4 kg (190 lb 6 oz)       General: Awake, alert, appropriate, following commands, NAD.  Neuro: CN II-XII grossly intact.   Skin: Bites with  punctures to left hand and right foot with surrounding erythema. No rashes,  skin color normal.  HEENT: Normal.   Lungs: Breathing comfortably and nonlabored, no wheezes or stridor noted.  Heart/Cardiovascular: Regular pulse, no peripheral cyanosis.  Abdomen: Soft, non-tender, non-distended.   Left Upper Extremity: No deformity. Volar puncture laceration just proximal to the MCP of the small finger with surrounding erythema that is TTP. Small finger in flexed posture with proximal TTP - no distal TTP tracking along flexor tendon. No fusiform swelling, no pain with extension of small finger. No significant tenderness to palpation over clavicle, AC joint, shoulder, arm, elbow, forearm, wrist. Normal ROM shoulder, elbow, wrist without pain. Motor intact distally with finger flexion/extension/intrinsics/EPL, OK sign 5/5 strength. SILT ax/m/r/u nerve distributions. Radial pulse palpable, 2+.   Right Lower Extremity: No deformity. Ecchymoses and tooth abrasions/superficial punctures to medial aspect of forefoot with mild erythema that is TTP. No significant tenderness to palpation over thigh, knee, leg, ankle/foot. No pain with ROM hip/knee/ankle. Mild discomfort with toe extremes of toe movement, no pain through majority of ROM arc. Motor intact distally TA/GSC/EHL/FHL with 5/5 strength. SILT sp/dp/tibial/saph/sural nerves. DP/PT pulses palpable, 2+, toes warm and well perfused.     LABS:  Hemoglobin   Date Value Ref Range Status   10/12/2018 15.8 13.3 - 17.7 g/dL Final     WBC   Date Value Ref Range Status   10/12/2018 10.8 4.0 - 11.0 10e9/L Final     Platelet Count   Date Value Ref Range Status   10/12/2018 226 150 - 450 10e9/L Final     No results found for: INR  Creatinine   Date Value Ref Range Status   10/12/2018 0.82 0.66 - 1.25 mg/dL Final     Glucose   Date Value Ref Range Status   10/12/2018 100 (H) 70 - 99 mg/dL Final     CRP 76.8    IMAGING:  Xray L hand: no evidence of fracture, dislocation, or retained  foreign bodies.  Xray R hand: no evidence of fracture, dislocation, or retained foreign bodies.    IMPRESSION:   James Orr is a 38 year old R-hand dominant male with the followin. Left hand dog bite cellulitis, monitoring for FTS  2. Right foot dog bite cellulitis    RECOMMENDATIONS:   - Medicine Primary  - No plan for OR at this time. Will continue to monitor his exam and trend CRP.  - Anticoagulation/DVT Prophylaxis: per primary  - Antibiotics/Tetanus: IV Zosyn  - X-rays/Imaging: complete  - Activity: up ad galen  - Weight bearing: WBAT LUE, WBAT RLE  - Pain control: PO with IV PRN  - Diet: NPO at midnight  - Follow-up: TBD  - Disposition: pending improvement of infection and transition to PO antibiotics    Assessment and Plan discussed with Dr. Owusu, Plastics and Orthopaedic Surgery.     Dylan Gay MD 10/12/2018 10:05 PM  Orthopaedic Surgery Resident, PGY-1  Pager: [phone]    For questions about this patient, please contact me at my pager.

## 2018-10-13 NOTE — PROGRESS NOTES
Pt was seen, case reviewed with team, ortho    Pt states L hand pain and swelling are much improved today  R foot pain and swelling is about the same  No fevers or chills  No nausea or emesis    VSS  Afebrile  Alert, fully oriented, appears well  CV rrr no M  L hand, significant decrease in swelling erythema over the dorsum and palmar surface lateral hand. No drainage. Improved ability to flex L fifth digit  R foot sl edema over dorsum of foot with faint erythema, no drainage      CRP 71.1  CBC nl  BC NG    Assessment    Dog bites L hand and R foot, with rapidly progressive infection prior to admission, much improved after 24 hours of Zosyn. R foot swelling unchanged, may represent bruising more than infection    Plan  Continue Zosyn for one more day, then consider discharge on Augmentin  Ortho f/u appreciated

## 2018-10-13 NOTE — PROGRESS NOTES
Orthopaedic Surgery Progress Note:       Subjective:   NAEO. Patient reports doing better this morning after IV abx. No new drainage. No fevers. Able to mobilize hand more freely    Objective:   Temp:  [97.3  F (36.3  C)-98.8  F (37.1  C)] 97.5  F (36.4  C)  Pulse:  [67-86] 74  Resp:  [16] 16  BP: (133-156)/(63-96) 137/63  SpO2:  [99 %-100 %] 100 %  No intake or output data in the 24 hours ending 10/13/18 1150    Gen: NAD. Resting comfortably in bed  Resp: Breathing comfortably on RA  RLE:  Erythema outlined with ink; no expansion  Appears less edematous compared to prior  No appreciated fluctuance or drainage  Fires ehl/fhl  LUE:   Hand without fluctuance or drainage.    Wound to palmar aspect of 5th MTP joint still patent without purulence   Erythema and edema to palmar aspect of hand improved from prior   Dorsal hand with erythema expanded beyond outline but edema and pain overall improved   Able to actively flex/extend all digits more comfortably, 5th digit with painless ROM    Labs:    Recent Labs  Lab 10/13/18  0654 10/12/18  0918   WBC 6.7 10.8   HGB 14.1 15.8    226   CRP 71.1* 76.8*        Assessment & Plan:   38 year old male now s/p Dog bite to left hand and right foot on 10/11    No surgical indication at this time    Medicine Primary  Activity: as tolerated; WBAT; allow affected extremities to rest and to be kept clean  Antibiotics: Agree with additional 24h IV abx per Medicine team; then discharge on PO x7-10d per primary  Dressings: dry dressings to be changed daily to keep wounds CDI  Diet: ADAT  Imaging: none further  Labs: CRP while inpatient  Dispo: if continues to clinically improve; anticipate discharge to Home on Sunday with PO abx and follow up within 1 week for wound check  Follow up: 1 week with Dr. Owusu; please place ortho referral   Appointment arrangements have been made via Epic Inbox, patient will receive confirmation phone call    Orthopaedics will sign off at this time. Please  call with any further questions or concerns    Shubham Pyle MD 10/13/2018  Orthopaedic Surgery Resident, PGY-4  Pager: [phone]

## 2018-10-13 NOTE — PLAN OF CARE
Problem: Skin and Soft Tissue Infection (Adult)  Goal: Signs and Symptoms of Listed Potential Problems Will be Absent, Minimized or Managed (Skin and Soft Tissue Infection)  Signs and symptoms of listed potential problems will be absent, minimized or managed by discharge/transition of care (reference Skin and Soft Tissue Infection (Adult) CPG).     VS: VSS and afebrile   O2: In room air, denies SOB and chest pain   Output: Voids spontaneously without difficulty   Last BM: 10/12 and passing gas   Activity: Up ad galen   Skin: Intact ex celullitis   Pain: Given PRN Tylenol for discomfort   CMS: Minimal numbness outer side of Big toe   Dressing: none   Diet: NPO at midnight   LDA: PIV: patent and SL between Abx   Equipment: IV pole and personal belongings at bedside   Plan: I and D today?   Additional Info: Showered this AM.

## 2018-10-14 VITALS
WEIGHT: 190.38 LBS | OXYGEN SATURATION: 100 % | BODY MASS INDEX: 29.88 KG/M2 | HEART RATE: 66 BPM | SYSTOLIC BLOOD PRESSURE: 119 MMHG | DIASTOLIC BLOOD PRESSURE: 85 MMHG | HEIGHT: 67 IN | TEMPERATURE: 97.4 F | RESPIRATION RATE: 16 BRPM

## 2018-10-14 PROCEDURE — 25000128 H RX IP 250 OP 636: Performed by: PHYSICIAN ASSISTANT

## 2018-10-14 PROCEDURE — 90686 IIV4 VACC NO PRSV 0.5 ML IM: CPT | Performed by: INTERNAL MEDICINE

## 2018-10-14 PROCEDURE — 99238 HOSP IP/OBS DSCHRG MGMT 30/<: CPT | Performed by: INTERNAL MEDICINE

## 2018-10-14 PROCEDURE — 25000128 H RX IP 250 OP 636: Performed by: INTERNAL MEDICINE

## 2018-10-14 RX ADMIN — PIPERACILLIN SODIUM,TAZOBACTAM SODIUM 3.38 G: 3; .375 INJECTION, POWDER, FOR SOLUTION INTRAVENOUS at 04:08

## 2018-10-14 RX ADMIN — INFLUENZA A VIRUS A/MICHIGAN/45/2015 X-275 (H1N1) ANTIGEN (FORMALDEHYDE INACTIVATED), INFLUENZA A VIRUS A/SINGAPORE/INFIMH-16-0019/2016 IVR-186 (H3N2) ANTIGEN (FORMALDEHYDE INACTIVATED), INFLUENZA B VIRUS B/PHUKET/3073/2013 ANTIGEN (FORMALDEHYDE INACTIVATED), AND INFLUENZA B VIRUS B/MARYLAND/15/2016 BX-69A ANTIGEN (FORMALDEHYDE INACTIVATED) 0.5 ML: 15; 15; 15; 15 INJECTION, SUSPENSION INTRAMUSCULAR at 10:12

## 2018-10-14 RX ADMIN — PIPERACILLIN SODIUM,TAZOBACTAM SODIUM 3.38 G: 3; .375 INJECTION, POWDER, FOR SOLUTION INTRAVENOUS at 10:17

## 2018-10-14 ASSESSMENT — ACTIVITIES OF DAILY LIVING (ADL)
ADLS_ACUITY_SCORE: 9
SWALLOWING: 0-->SWALLOWS FOODS/LIQUIDS WITHOUT DIFFICULTY
BATHING: 0-->INDEPENDENT
ADLS_ACUITY_SCORE: 11
DRESS: 0-->INDEPENDENT
TOILETING: 0-->INDEPENDENT
ADLS_ACUITY_SCORE: 12
RETIRED_EATING: 0-->INDEPENDENT
RETIRED_COMMUNICATION: 0-->UNDERSTANDS/COMMUNICATES WITHOUT DIFFICULTY

## 2018-10-14 NOTE — DISCHARGE SUMMARY
Admit Date:     10/12/2018   Discharge Date:     10/14/2018      HISTORY:  James Orr is a very pleasant 38-year-old male without any chronic medical problems, who was admitted to the hospital through the ER for the evaluation of left hand cellulitis with lymphangitis developing 24 hours after he was bitten by a dog.  The patient noted that both dog's immunizations including rabies were up-to-date.  The patient did receive a tetanus injection in the ER.      HOSPITAL COURSE:  The patient was admitted to the medical unit, was started empirically on Zosyn.  He was seen by orthopedics who recommended watchful waiting.  The patient's hand pain and swelling did rapidly improve over the next 48 hours on empiric Zosyn.  He remained afebrile throughout.  White blood cell count trended down from 10,800-6700 one day after admission.  CRP did trend down from 77- 71 after 24 hours of therapy.  Orthopedics did follow the patient and continued to recommend no operative therapy.        The patient's right foot was also monitored as he did suffer a bite to the right foot and had developed some swelling and erythema in the foot.  This remained stable and overall appeared to be most consistent with a bruise rather than a cellulitis.      The patient's pain was adequately controlled without need for opiates.      DISPOSITION:  The patient will discharge to home.  He was prescribed Augmentin to complete a 14-day total course of therapy.      DISCHARGE DIAGNOSES:   1.  Cellulitis with lymphangitis, left hand, following dog bite with rapid improvement with IV antibiotics.   2.  Right foot bite wound with likely a mild soft tissue injury, less likely a cellulitis, stable.   3.  Overall good health.      DISCHARGE MEDICATIONS:  Augmentin 875/125 one tablet twice daily for 11 days.        DISCHARGE INSTRUCTIONS:  The patient was advised to report to the ER should he develop worsening pain in his hand or foot or if he experiences  severe diarrhea.  The patient will be contacted by orthopedics clinic for a followup with Dr. Owusu within 2 weeks.         GHISLAINE FIELDS MD             D: 10/14/2018   T: 10/14/2018   MT: PENNY      Name:     ARNOLDO CHAUDHRY   MRN:      [id number]        Account:        [id number]   :      1980           Admit Date:     10/12/2018                                  Discharge Date: 10/14/2018      Document: [id number]

## 2018-10-14 NOTE — PROGRESS NOTES
Pt feels well  L hand and R foot pain and swelling continue to improve  No fevers, chills, abd pain or diarrhea    VSS  Afebrile  L hand, marked improvement in swelling, full ROM of fingers, no erythema  R foot erythema is faint, less extensive  CV rrr    Assessment    Dog bites L hand (with significant cellulitis) and R hand, both improved    Pt is stable for discharge on Augmentin for total 14 day course

## 2018-10-14 NOTE — DISCHARGE INSTRUCTIONS
Return to the Emergency Room if you experience increased pain or swelling left hand or right foot, or if you experience severe diarrhea

## 2018-10-14 NOTE — PROGRESS NOTES
Pt was given discharge instructions and verbalized understanding, Pt received discharge medications. Pt left unit at approx 1110 independently to go home.

## 2018-10-14 NOTE — PLAN OF CARE
"Problem: Patient Care Overview  Goal: Plan of Care/Patient Progress Review  Note for the night shift.  D: Pt awake at the start of the shift and alert and oriented. Says he will probably being going to sleep soon. Has no complaints or needs. Says the \"puffiness\" in his left hand and right foot have decreased since he came in. Still has light redness on the top of the right foot(inside the lines) and some along the outside of the foot also. Pt says it is not new. That it is actually better and less pink. Says it only hurt if he pushes right on the bite marks.Still has mild puffiness in his left hand and right foot. Able to close his hand now. Was able to sleep tonight. No complaints this AM either. Hand and foot look the same.  Call light with in reach.Able to make needs known.  A: Doing OK.P: Monitor closely.  Supportive cares. Medicate for pain as needed.      "

## 2018-10-14 NOTE — PLAN OF CARE
Pt Alert and Oriented X 4. Afebrile. VSS. Lungs- Clear bilaterally with both anterior and posterior. IS encouraged. Bowels- active in all four quadrants. PP+ DP+. CMS and Neuro's are intact to baseline. Denies numbness in all extremities, baseline tingling present to the side of big toe. Denies pain, nausea, shortness of breath, and chest pain. Voids spontaneously without difficulty in BR. Is on a regular diet and appetite was good this shift. No change in L-hand erthymea and R-foot puncture marks. Pt is independent in his room. PIV is patent and SL. Pt is able to make needs known and the call light is within the pt's reach. Continue to monitor.

## 2018-10-15 ENCOUNTER — PATIENT OUTREACH (OUTPATIENT)
Dept: CARE COORDINATION | Facility: CLINIC | Age: 38
End: 2018-10-15

## 2018-10-15 NOTE — PROGRESS NOTES
[facility]: Post-Discharge Note  SITUATION                                                      Admission:    Admission Date: 10/12/18   Reason for Admission: Cellulitis with lymphangitis, left hand, following dog bite with rapid improvement with IV antibiotics.   Discharge:   Discharge Date: 10/14/18  Discharge Diagnosis: Cellulitis with lymphangitis, left hand, following dog bite with rapid improvement with IV antibiotics.   Discharge Service: Internal Medicine     BACKGROUND                                                      James Orr is a very pleasant 38-year-old male without any chronic medical problems, who was admitted to the hospital through the ER for the evaluation of left hand cellulitis with lymphangitis developing 24 hours after he was bitten by a dog.  The patient noted that both dog's immunizations including rabies were up-to-date.  The patient did receive a tetanus injection in the ER.     ASSESSMENT      Discharge Assessment  Patient reports symptoms are: Improved  Does the patient have all of their medications?: Yes  Does patient know what their new medications are for?: Yes  Does patient have a follow-up appointment scheduled?: No  Does patient have any other questions or concerns?: No    Post-op  Did the patient have surgery or a procedure: No    PLAN                                                      Outpatient Plan:  The patient will be contacted by orthopedics clinic for a followup with Dr. Owusu within 2 weeks.       Pati Huffman, [facility]

## 2018-10-18 LAB
BACTERIA SPEC CULT: NO GROWTH
BACTERIA SPEC CULT: NO GROWTH
Lab: NORMAL
Lab: NORMAL
SPECIMEN SOURCE: NORMAL
SPECIMEN SOURCE: NORMAL

## 2018-10-23 ENCOUNTER — OFFICE VISIT (OUTPATIENT)
Dept: ORTHOPEDICS | Facility: CLINIC | Age: 38
End: 2018-10-23
Payer: COMMERCIAL

## 2018-10-23 VITALS — HEIGHT: 67 IN | WEIGHT: 190 LBS | BODY MASS INDEX: 29.82 KG/M2
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
## 2018-10-23 DIAGNOSIS — S61.452A DOG BITE, HAND, LEFT, INITIAL ENCOUNTER: Primary | ICD-10-CM

## 2018-10-23 NOTE — LETTER
Date:October 25, 2018      Patient was self referred, no letter generated. Do not send.        [facility] Information

## 2018-10-23 NOTE — MR AVS SNAPSHOT
"              After Visit Summary   10/23/2018    James Orr    MRN: [id number]           Patient Information     Date Of Birth          1980        Visit Information        Provider Department      10/23/2018 10:00 AM [facility]        Today's Diagnoses     Dog bite, hand, left, initial encounter    -  1       Follow-ups after your visit        Who to contact     Please call your clinic at [phone] to:    Ask questions about your health    Make or cancel appointments    Discuss your medicines    Learn about your test results    Speak to your doctor            Additional Information About Your Visit        MyChart Information     [employer] is an electronic gateway that provides easy, online access to your medical records. With [employer], you can request a clinic appointment, read your test results, renew a prescription or communicate with your care team.     To sign up for "[employer]t visit the website at [email or web address]   You will be asked to enter the access code listed below, as well as some personal information. Please follow the directions to create your username and password.     Your access code is: [email or web address]  Expires: 2019  4:48 AM     Your access code will  in 90 days. If you need help or a new code, please contact your [facility] or call [phone] for assistance.        Care EveryWhere ID     This is your Care EveryWhere ID. This could be used by other organizations to access your [place] medical records  [id number]        Your Vitals Were     Height BMI (Body Mass Index)                5' 7\" 29.76 kg/m2           Blood Pressure from Last 3 Encounters:   10/14/18 119/85   13 (!) 141/97    Weight from Last 3 Encounters:   10/23/18 190 lb   10/12/18 190 lb 6 oz              Today, you had the following     No orders found for display       Primary Care Provider Fax #    Physician No Ref-Primary " [phone]       No address on file        Equal Access to Services     ALIYAH SHIMA : Hadii aad ku hadshahidaron Chavaali, baudilioallen crespoluis danielha, cassius darciebenoitallen clintonrosa elenaallen, waxnancy kwamein hayaajamee enriquesingh lopezkylie whiting. So [facility] [phone].    ATENCIÓN: Si habla español, tiene a dill disposición servicios gratuitos de asistencia lingüística. Llame al [phone].    We comply with applicable federal civil rights laws and [place] laws. We do not discriminate on the basis of race, color, national origin, age, disability, sex, sexual orientation, or gender identity.            Thank you!     Thank you for choosing [facility]  for your care. Our goal is always to provide you with excellent care. Hearing back from our patients is one way we can continue to improve our services. Please take a few minutes to complete the written survey that you may receive in the mail after your visit with us. Thank you!             Your Updated Medication List - Protect others around you: Learn how to safely use, store and throw away your medicines at www.disposemymeds.org.          This list is accurate as of 10/23/18  1:36 PM.  Always use your most recent med list.                   Brand Name Dispense Instructions for use Diagnosis    amoxicillin-clavulanate 875-125 MG per tablet    AUGMENTIN    22 tablet    Take 1 tablet by mouth 2 times daily for 11 days    Cellulitis of left upper extremity

## 2018-10-23 NOTE — NURSING NOTE
"Reason For Visit:   Chief Complaint   Patient presents with     RECHECK     The patient is following up today after a dog bite. doing well.        Primary MD: No Ref-Primary, Physician      Age: 38 year old    ?  No      Ht 1.702 m (5' 7\")  Wt 86.2 kg (190 lb)  BMI 29.76 kg/m2      Pain Assessment  Patient Currently in Pain: Denies    Hand Dominance Evaluation  Hand Dominance: Right          QuickDASH Assessment  No flowsheet data found.       Current Outpatient Prescriptions   Medication Sig Dispense Refill     amoxicillin-clavulanate (AUGMENTIN) 875-125 MG per tablet Take 1 tablet by mouth 2 times daily for 11 days 22 tablet 0       Allergies   Allergen Reactions     Metal [Staples]      Sensitive to metal on clothing. Like belts, etc.       Gela Marino, ATC  "

## 2018-10-23 NOTE — PROGRESS NOTES
"REFERRING PROVIDER: Dinesh Pryor    REASON FOR CONSULTATION: Left hand dog bite.    HPI: Patient is a 38-year-old right-hand-dominant male who sustained a dog bite to his left hand. This was complicated by cellulitis, requiring a couple day hospital stay with IV antibiotics. He is here for evaluation following treatment of cellulitis. He reports that he is doing well. Denies any fever or chills. He is able to perform all activities of daily living with his left hand. Denies any numbness or tingling.    MEDS:   Prior to Admission medications    Medication Sig Start Date End Date Taking? Authorizing Provider   amoxicillin-clavulanate (AUGMENTIN) 875-125 MG per tablet Take 1 tablet by mouth 2 times daily for 11 days 10/14/18 10/25/18  Dinesh Pryor MD       ALLERGIES:   Allergies   Allergen Reactions     Metal [Staples]      Sensitive to metal on clothing. Like belts, etc.       PMH: No past medical history on file.    PSH: No past surgical history on file.    SH:   Social History   Substance Use Topics     Smoking status: Never Smoker     Smokeless tobacco: Current User      Comment: Vaps     Alcohol use Yes      Comment: 1-2/night       FH: No family history on file.    ROS: Denies chest pain, shortness of breath, MI, CVA, diabetes, DVT, PE, and bleeding disorders.    PHYSICAL EXAMINATION: Ht 1.702 m (5' 7\")  Wt 86.2 kg (190 lb)  BMI 29.76 kg/m2  Gen.: No acute distress.  On examination of the left hand, volar small finger base puncture wounds are fully healed. There is no swelling about the small or ring fingers. Passive extension of the fingers does not elicit any pain. Palpation of the palm does not elicit any pain. Patient is able to make a composite fist and extend all digits. There is no scissoring. Sensation to light touch is intact in the radial and ulnar borders of the small finger. Radial artery and ulnar artery pulses are palpable at the wrist.    ASSESSMENT: Healed left hand injury.    PLAN: " Patient is to finish out his course of oral antibiotics. I have no activity restrictions for him. I will see the patient back as needed.    Total time spent with patient was 30 min of which greater than 50% was in counseling.    Answers for HPI/ROS submitted by the patient on 10/23/2018   PHQ-2 Score: 0

## 2018-10-23 NOTE — LETTER
"10/23/2018       RE: James Orr  [address] [place] [address]     Dear Colleague,    Thank you for referring your patient, James Orr, to [facility]. Please see a copy of my visit note below.    REFERRING PROVIDER: Dinesh Pryor    REASON FOR CONSULTATION: Left hand dog bite.    HPI: Patient is a 38-year-old right-hand-dominant male who sustained a dog bite to his left hand. This was complicated by cellulitis, requiring a couple day hospital stay with IV antibiotics. He is here for evaluation following treatment of cellulitis. He reports that he is doing well. Denies any fever or chills. He is able to perform all activities of daily living with his left hand. Denies any numbness or tingling.    MEDS:   Prior to Admission medications    Medication Sig Start Date End Date Taking? Authorizing Provider   amoxicillin-clavulanate (AUGMENTIN) 875-125 MG per tablet Take 1 tablet by mouth 2 times daily for 11 days 10/14/18 10/25/18  Dinesh Pryor MD       ALLERGIES:   Allergies   Allergen Reactions     Metal [Staples]      Sensitive to metal on clothing. Like belts, etc.       PMH: No past medical history on file.    PSH: No past surgical history on file.    SH:   Social History   Substance Use Topics     Smoking status: Never Smoker     Smokeless tobacco: Current User      Comment: Vaps     Alcohol use Yes      Comment: 1-2/night       FH: No family history on file.    ROS: Denies chest pain, shortness of breath, MI, CVA, diabetes, DVT, PE, and bleeding disorders.    PHYSICAL EXAMINATION: Ht 1.702 m (5' 7\")  Wt 86.2 kg (190 lb)  BMI 29.76 kg/m2  Gen.: No acute distress.  On examination of the left hand, volar small finger base puncture wounds are fully healed. There is no swelling about the small or ring fingers. Passive extension of the fingers does not elicit any pain. Palpation of the palm does not elicit any pain. " Patient is able to make a composite fist and extend all digits. There is no scissoring. Sensation to light touch is intact in the radial and ulnar borders of the small finger. Radial artery and ulnar artery pulses are palpable at the wrist.    ASSESSMENT: Healed left hand injury.    PLAN: Patient is to finish out his course of oral antibiotics. I have no activity restrictions for him. I will see the patient back as needed.    Total time spent with patient was 30 min of which greater than 50% was in counseling.    Answers for HPI/ROS submitted by the patient on 10/23/2018   PHQ-2 Score: 0      Again, thank you for allowing me to participate in the care of your patient.      Sincerely,    Mickey Owusu MD